# Patient Record
Sex: FEMALE | Race: WHITE | URBAN - METROPOLITAN AREA
[De-identification: names, ages, dates, MRNs, and addresses within clinical notes are randomized per-mention and may not be internally consistent; named-entity substitution may affect disease eponyms.]

---

## 2017-12-20 DIAGNOSIS — Z30.41 SURVEILLANCE OF PREVIOUSLY PRESCRIBED CONTRACEPTIVE PILL: ICD-10-CM

## 2017-12-22 NOTE — TELEPHONE ENCOUNTER
Requested Prescriptions   Pending Prescriptions Disp Refills     GIANVI 3-0.02 MG per tablet [Pharmacy Med Name: GIANVI 3 MG-0.02 MG TABLET]  Last Written Prescription Date:  11/25/16  Last Fill Quantity: 84 tablet,  # refills: 1   Last Office Visit with [facility], P or [facility] prescribing provider:  11/25/2016   Future Office Visit:    84 tablet 1     Sig: TAKE 1 TABLET BY MOUTH ONCE DAILY.    Contraceptives Protocol Failed    12/20/2017 12:59 AM       Failed - Recent or future visit with authorizing provider's specialty    Patient had office visit in the last year or has a visit in the next 30 days with authorizing provider.  See chart review.          Passed - Patient is not a current smoker if age is 35 or older       Passed - No active pregnancy on record       Passed - No positive pregnancy test in past 12 months

## 2017-12-23 RX ORDER — DROSPIRENONE AND ETHINYL ESTRADIOL 0.02-3(28)
KIT ORAL
Qty: 84 TABLET | Refills: 1 | OUTPATIENT
Start: 2017-12-23

## 2018-01-22 ENCOUNTER — OFFICE VISIT (OUTPATIENT)
Dept: FAMILY MEDICINE | Facility: CLINIC | Age: 33
End: 2018-01-22
Payer: COMMERCIAL

## 2018-01-22 VITALS
TEMPERATURE: 97.3 F | WEIGHT: 134 LBS | RESPIRATION RATE: 16 BRPM | DIASTOLIC BLOOD PRESSURE: 65 MMHG | BODY MASS INDEX: 22.88 KG/M2 | HEIGHT: 64 IN | HEART RATE: 78 BPM | SYSTOLIC BLOOD PRESSURE: 107 MMHG | OXYGEN SATURATION: 100 %

## 2018-01-22 DIAGNOSIS — Z00.00 ROUTINE GENERAL MEDICAL EXAMINATION AT A HEALTH CARE FACILITY: Primary | ICD-10-CM

## 2018-01-22 DIAGNOSIS — Z31.41 FERTILITY TESTING: ICD-10-CM

## 2018-01-22 LAB
CHOLEST SERPL-MCNC: 163 MG/DL
GLUCOSE SERPL-MCNC: 81 MG/DL (ref 70–99)
HDLC SERPL-MCNC: 90 MG/DL
LDLC SERPL CALC-MCNC: 60 MG/DL
NONHDLC SERPL-MCNC: 73 MG/DL
TRIGL SERPL-MCNC: 63 MG/DL

## 2018-01-22 PROCEDURE — 36415 COLL VENOUS BLD VENIPUNCTURE: CPT | Performed by: NURSE PRACTITIONER

## 2018-01-22 PROCEDURE — 80061 LIPID PANEL: CPT | Performed by: NURSE PRACTITIONER

## 2018-01-22 PROCEDURE — 82947 ASSAY GLUCOSE BLOOD QUANT: CPT | Performed by: NURSE PRACTITIONER

## 2018-01-22 PROCEDURE — 99395 PREV VISIT EST AGE 18-39: CPT | Performed by: NURSE PRACTITIONER

## 2018-01-22 NOTE — MR AVS SNAPSHOT
After Visit Summary   1/22/2018    Rin Tony    MRN: [id number]           Patient Information     Date Of Birth          1985        Visit Information        Provider Department      1/22/2018 7:20 AM Malina Farr APRN [facility]        Today's Diagnoses     Routine general medical examination at a health care facility    -  1    Fertility testing          Care Instructions      Preventive Health Recommendations  Female Ages 26 - 39  Yearly exam:   See your health care provider every year in order to    Review health changes.     Discuss preventive care.      Review your medicines if you your doctor has prescribed any.    Until age 30: Get a Pap test every three years (more often if you have had an abnormal result).    After age 30: Talk to your doctor about whether you should have a Pap test every 3 years or have a Pap test with HPV screening every 5 years.   You do not need a Pap test if your uterus was removed (hysterectomy) and you have not had cancer.  You should be tested each year for STDs (sexually transmitted diseases), if you're at risk.   Talk to your provider about how often to have your cholesterol checked.  If you are at risk for diabetes, you should have a diabetes test (fasting glucose).  Shots: Get a flu shot each year. Get a tetanus shot every 10 years.   Nutrition:     Eat at least 5 servings of fruits and vegetables each day.    Eat whole-grain bread, whole-wheat pasta and brown rice instead of white grains and rice.    Talk to your provider about Calcium and Vitamin D.     Lifestyle    Exercise at least 150 minutes a week (30 minutes a day, 5 days of the week). This will help you control your weight and prevent disease.    Limit alcohol to one drink per day.    No smoking.     Wear sunscreen to prevent skin cancer.    See your dentist every six months for an exam and cleaning.            Follow-ups after your visit        Additional Services      OB/GYN REFERRAL       Your provider has referred you to:  [facility]: [facility] [phone]   [email or web address]    Please be aware that coverage of these services is subject to the terms and limitations of your health insurance plan.  Call member services at your health plan with any benefit or coverage questions.      Please bring the following with you to your appointment:    (1) Any X-Rays, CTs or MRIs which have been performed.  Contact the facility where they were done to arrange for  prior to your scheduled appointment.   (2) List of current medications   (3) This referral request   (4) Any documents/labs given to you for this referral                  Who to contact     If you have questions or need follow up information about today's clinic visit or your schedule please contact [facility] directly at [phone].  Normal or non-critical lab and imaging results will be communicated to you by MyChart, letter or phone within 4 business days after the clinic has received the results. If you do not hear from us within 7 days, please contact the clinic through [employer] or phone. If you have a critical or abnormal lab result, we will notify you by phone as soon as possible.  Submit refill requests through [employer] or call your pharmacy and they will forward the refill request to us. Please allow 3 business days for your refill to be completed.          Additional Information About Your Visit        MyChart Information     [employer] gives you secure access to your electronic health record. If you see a primary care provider, you can also send messages to your care team and make appointments. If you have questions, please call your primary care clinic.  If you do not have a primary care provider, please call [phone] and they will assist you.        Care EveryWhere ID     This is your Care EveryWhere ID. This could be used by other  "organizations to access your [place] medical records  [id number]        Your Vitals Were     Pulse Temperature Respirations Height Last Period Pulse Oximetry    78 97.3  F (36.3  C) (Tympanic) 16 5' 4\" (1.626 m) 01/09/2018 100%    BMI (Body Mass Index)                   23 kg/m2            Blood Pressure from Last 3 Encounters:   01/22/18 107/65   06/15/16 118/66   06/16/15 104/72    Weight from Last 3 Encounters:   01/22/18 134 lb (60.8 kg)   06/15/16 126 lb 12.8 oz (57.5 kg)   06/16/15 127 lb 3.2 oz (57.7 kg)              We Performed the Following     Glucose     Lipid Profile (Chol, Trig, HDL, LDL calc)     OB/GYN REFERRAL        Primary Care Provider Office Phone # Fax #    Malina Farr THEO [facility] [phone] [address] [address] [facility] [address]        Equal Access to Services     LYNNE NAVARRO : Hadii aad ku hadasho Soomaali, waaxda luqadaha, qaybta kaalmada adeegyada, waxay idiin hayaan adeeg miguelarasilvia la'jacques . So [facility] [phone].    ATENCIÓN: Si habla español, tiene a herbert disposición servicios gratuitos de asistencia lingüística. Llame al [phone].    We comply with applicable federal civil rights laws and [place] laws. We do not discriminate on the basis of race, color, national origin, age, disability, sex, sexual orientation, or gender identity.            Thank you!     Thank you for choosing [facility]  for your care. Our goal is always to provide you with excellent care. Hearing back from our patients is one way we can continue to improve our services. Please take a few minutes to complete the written survey that you may receive in the mail after your visit with us. Thank you!             Your Updated Medication List - Protect others around you: Learn how to safely use, store and throw away your medicines at www.disposemymeds.org.          This list is accurate as of: 1/22/18  7:43 AM.  Always use your most recent med list.                   " Brand Name Dispense Instructions for use Diagnosis    drospirenone-ethinyl estradiol 3-0.02 MG per tablet    KIKO    84 tablet    Take 1 tablet by mouth daily    Surveillance of previously prescribed contraceptive pill

## 2018-01-22 NOTE — PROGRESS NOTES
SUBJECTIVE:   CC: Rin Tony is an 32 year old woman who presents for preventive health visit.     Physical   Annual:     Getting at least 3 servings of Calcium per day::  Yes    Bi-annual eye exam::  Yes    Dental care twice a year::  Yes    Sleep apnea or symptoms of sleep apnea::  None    Diet::  Other    Frequency of exercise::  1 day/week    Duration of exercise::  15-30 minutes    Taking medications regularly::  Yes    Medication side effects::  Not applicable    Additional concerns today::  YES (discuss birth control )          Today's PHQ-2 Score:   PHQ-2 ( 1999 [employer]) 1/22/2018   Q1: Little interest or pleasure in doing things 0   Q2: Feeling down, depressed or hopeless 0   PHQ-2 Score 0   Q1: Little interest or pleasure in doing things Not at all   Q2: Feeling down, depressed or hopeless Not at all   PHQ-2 Score 0       Abuse: Current or Past(Physical, Sexual or Emotional)- No  Do you feel safe in your environment - Yes    Social History   Substance Use Topics     Smoking status: Never Smoker     Smokeless tobacco: Never Used     Alcohol use Yes      Comment: on weekends      Alcohol Use 1/22/2018   If you drink alcohol, do you typically have greater than 3 drinks per day OR greater than 7 drinks per week?   No       Reviewed orders with patient.  Reviewed health maintenance and updated orders accordingly - Yes    Pertinent mammograms are reviewed under the imaging tab.  History of abnormal Pap smear: NO - age 30-65 PAP every 5 years with negative HPV co-testing recommended    Reviewed and updated as needed this visit by clinical staff  Tobacco  Allergies  Meds  Problems  Med Hx  Surg Hx  Fam Hx  Soc Hx          Reviewed and updated as needed this visit by Provider  Tobacco  Allergies  Meds  Problems  Med Hx  Surg Hx  Fam Hx  Soc Hx         Sexually active, male  partners,  Would not like std screening.  Is using condoms for contraception.      Works at [employer].     Does exercise  "regularly.  Does eat a well balanced diet including plenty of fruits, vegetables, calcium and proteins.      Does not  have any changes to vision or hearing.  Does  see a dentist regularly.      Is currently  safe in relationships, not being harmed by anyone.      Went off ocp's.  Planning to use condoms.      Review of Systems  C: NEGATIVE for fever, chills, change in weight  I: NEGATIVE for worrisome rashes, moles or lesions  E: NEGATIVE for vision changes or irritation  ENT: NEGATIVE for ear, mouth and throat problems  R: NEGATIVE for significant cough or SOB  B: NEGATIVE for masses, tenderness or discharge  CV: NEGATIVE for chest pain, palpitations or peripheral edema  GI: NEGATIVE for nausea, abdominal pain, heartburn, or change in bowel habits  : NEGATIVE for unusual urinary or vaginal symptoms. Periods are regular.  M: NEGATIVE for significant arthralgias or myalgia  N: NEGATIVE for weakness, dizziness or paresthesias  P: NEGATIVE for changes in mood or affect     OBJECTIVE:   /65 (BP Location: Left arm, Patient Position: Chair, Cuff Size: Adult Regular)  Pulse 78  Temp 97.3  F (36.3  C) (Tympanic)  Resp 16  Ht 5' 4\" (1.626 m)  Wt 134 lb (60.8 kg)  LMP 01/09/2018  SpO2 100%  BMI 23 kg/m2  Physical Exam  GENERAL: healthy, alert and no distress  EYES: Eyes grossly normal to inspection, PERRL and conjunctivae and sclerae normal  HENT: ear canals and TM's normal, nose and mouth without ulcers or lesions  NECK: no adenopathy, no asymmetry, masses, or scars and thyroid normal to palpation  RESP: lungs clear to auscultation - no rales, rhonchi or wheezes  BREAST: normal without masses, tenderness or nipple discharge and no palpable axillary masses or adenopathy  CV: regular rate and rhythm, normal S1 S2, no S3 or S4, no murmur, click or rub, no peripheral edema and peripheral pulses strong  ABDOMEN: soft, nontender, no hepatosplenomegaly, no masses and bowel sounds normal  MS: no gross " "musculoskeletal defects noted, no edema  SKIN: no suspicious lesions or rashes  NEURO: Normal strength and tone, mentation intact and speech normal  PSYCH: mentation appears normal, affect normal/bright    ASSESSMENT/PLAN:       ICD-10-CM    1. Routine general medical examination at a health care facility Z00.00 Lipid Profile (Chol, Trig, HDL, LDL calc)     Glucose   2. Fertility testing Z31.41 OB/GYN REFERRAL      well female,  fasting for labs.  Encouraged to continue exercise and healthy diet choices.      Desires fertility testing.    COUNSELING:  Reviewed preventive health counseling, as reflected in patient instructions         reports that she has never smoked. She has never used smokeless tobacco.    Estimated body mass index is 23 kg/(m^2) as calculated from the following:    Height as of this encounter: 5' 4\" (1.626 m).    Weight as of this encounter: 134 lb (60.8 kg).         Counseling Resources:  ATP IV Guidelines  Pooled Cohorts Equation Calculator  Breast Cancer Risk Calculator  FRAX Risk Assessment  ICSI Preventive Guidelines  Dietary Guidelines for Americans, 2010  USDA's MyPlate  ASA Prophylaxis  Lung CA Screening    THEO Bruno CNP  [facility] for HPI/ROS submitted by the patient on 1/22/2018   PHQ-2 Score: 0    "

## 2020-03-01 ENCOUNTER — HEALTH MAINTENANCE LETTER (OUTPATIENT)
Age: 35
End: 2020-03-01

## 2020-12-14 ENCOUNTER — HEALTH MAINTENANCE LETTER (OUTPATIENT)
Age: 35
End: 2020-12-14

## 2021-04-18 ENCOUNTER — HEALTH MAINTENANCE LETTER (OUTPATIENT)
Age: 36
End: 2021-04-18

## 2021-10-02 ENCOUNTER — HEALTH MAINTENANCE LETTER (OUTPATIENT)
Age: 36
End: 2021-10-02

## 2022-05-14 ENCOUNTER — HEALTH MAINTENANCE LETTER (OUTPATIENT)
Age: 37
End: 2022-05-14

## 2022-09-03 ENCOUNTER — HEALTH MAINTENANCE LETTER (OUTPATIENT)
Age: 37
End: 2022-09-03

## 2023-06-03 ENCOUNTER — HEALTH MAINTENANCE LETTER (OUTPATIENT)
Age: 38
End: 2023-06-03